# Patient Record
Sex: FEMALE | Race: WHITE | Employment: FULL TIME | URBAN - METROPOLITAN AREA
[De-identification: names, ages, dates, MRNs, and addresses within clinical notes are randomized per-mention and may not be internally consistent; named-entity substitution may affect disease eponyms.]

---

## 2017-09-19 ENCOUNTER — OFFICE VISIT (OUTPATIENT)
Dept: ORTHOPEDIC SURGERY | Age: 42
End: 2017-09-19

## 2017-09-19 VITALS
SYSTOLIC BLOOD PRESSURE: 104 MMHG | DIASTOLIC BLOOD PRESSURE: 73 MMHG | HEART RATE: 70 BPM | BODY MASS INDEX: 28.17 KG/M2 | WEIGHT: 165 LBS | HEIGHT: 64 IN

## 2017-09-19 DIAGNOSIS — S93.401A MODERATE ANKLE SPRAIN, RIGHT, INITIAL ENCOUNTER: ICD-10-CM

## 2017-09-19 DIAGNOSIS — M25.571 ACUTE RIGHT ANKLE PAIN: Primary | ICD-10-CM

## 2017-09-19 PROBLEM — S93.409A MODERATE ANKLE SPRAIN: Status: ACTIVE | Noted: 2017-09-19

## 2017-09-19 PROCEDURE — 99203 OFFICE O/P NEW LOW 30 MIN: CPT | Performed by: PODIATRIST

## 2017-09-19 PROCEDURE — L1902 AFO ANKLE GAUNTLET PRE OTS: HCPCS | Performed by: PODIATRIST

## 2017-09-19 PROCEDURE — 73610 X-RAY EXAM OF ANKLE: CPT | Performed by: PODIATRIST

## 2017-09-19 RX ORDER — VENLAFAXINE HYDROCHLORIDE 75 MG/1
75 CAPSULE, EXTENDED RELEASE ORAL DAILY
COMMUNITY

## 2017-09-19 RX ORDER — TRAZODONE HYDROCHLORIDE 150 MG/1
150 TABLET ORAL NIGHTLY
COMMUNITY
End: 2017-10-10 | Stop reason: HOSPADM

## 2017-09-19 RX ORDER — SPIRONOLACTONE 25 MG/1
25 TABLET ORAL DAILY
COMMUNITY

## 2017-09-20 ENCOUNTER — HOSPITAL ENCOUNTER (OUTPATIENT)
Dept: PHYSICAL THERAPY | Age: 42
Discharge: HOME | End: 2017-09-30
Admitting: PODIATRIST

## 2017-09-20 NOTE — PLAN OF CARE
[facility], [facility]  [address] [place] [address]  Phone: [phone]  Fax [phone]     Physical Therapy Certification    Dear Referring Practitioner: Luke Mccloud,    We had the pleasure of evaluating the following patient for physical therapy services at [address] [place] [address]. A summary of our findings can be found in the initial assessment below. This includes our plan of care. If you have any questions or concerns regarding these findings, please do not hesitate to contact me at the office phone number checked above. Thank you for the referral.       Physician Signature:_______________________________Date:__________________  By signing above (or electronic signature), therapists plan is approved by physician    Patient: Topher Diana   : 1975   MRN: [id number]  Referring Physician: Referring Practitioner: Luke Mccloud      Evaluation Date: 2017      Medical Diagnosis Information:  Diagnosis: R ankle sprain (M25.571)   Treatment Diagnosis: R ankle pain (M25.571)                                         Insurance information: PT Insurance Information: [place]     Precautions/ Contra-indications: None  Latex Allergy:  [x]NO      []YES  Preferred Language for Healthcare:   [x]English       []other:    SUBJECTIVE: Patient stated complaint: 3 weeks ago pt missed the last step with descending stairs, rolled foot underneath. Initially had high levels of pain, treated with compression. Was swollen and painful, pain was continuing. Pt started using Aircast yesterday, did use a figure 8 brace prior to this. Was taking steroids for back pain prior to this injury. Pt stated MD instructed her to wear the brace for 3 weeks until follow up.     Relevant Medical History: Sprained R ankle 20 years ago  Functional Disability Index:PT G-Codes  Functional Assessment Tool Used: LEFS  Score: 25%  Functional Limitation: Mobility: Walking and moving around  Mobility: Walking and Moving Around Current Status (): At least 20 percent but less than 40 percent impaired, limited or restricted  Mobility: Walking and Moving Around Goal Status (): At least 1 percent but less than 20 percent impaired, limited or restricted    Pain Scale: 0-3/10  Easing factors: elevation, ice, ibuprofen, rest  Provocative factors: Walking, standing, squatting, stairs    Type: []Constant   [x]Intermittent  []Radiating []Localized []other:     Numbness/Tingling: Denies    Occupation/School: Nurse practitioner in [employer]    Living Status/Prior Level of Function: Independent with ADLs and IADLs    OBJECTIVE:     ROM LEFT RIGHT   Ankle PF 65 45 w/ pain   Ankle DF 10 5    Ankle In 50 25 w/ pain   Ankle Ev 25 10 w/ pain   Strength  LEFT RIGHT   Ankle DF 5 4   Ankle PF 4+ 4   Ankle Inv 5 4- w/ pain   Ankle EV 5 4        Circumference  Mid apex  7 cm prox   49 cm.     49.5 cm     Reflexes/Sensation:    []Dermatomes/Myotomes intact    [x]Reflexes equal and normal bilaterally   []Other:    Joint mobility:   []Normal    []Hypo   []Hyper    Palpation: TTP along CFL, posterior and lateral ankle    Functional Mobility/Transfers: No limitations    Gait: (include devices/WB status) Mild antalgic gait, dec toe off on RLE      [x] Patient history, allergies, meds reviewed. Medical chart reviewed. See intake form. Review Of Systems (ROS):  [x]Performed Review of systems (Integumentary, CardioPulmonary, Neurological) by intake and observation. Intake form has been scanned into medical record. Patient has been instructed to contact their primary care physician regarding ROS issues if not already being addressed at this time.       Co-morbidities/Complexities (which will affect course of rehabilitation):  []None           Arthritic conditions   []Rheumatoid arthritis (M05.9)  [x]Osteoarthritis (M19.91)   Cardiovascular conditions   []Hypertension (I10)  []Hyperlipidemia prevent re-injury. 2. Patient will have a decrease in pain to facilitate improvement in movement, function, and ADLs as indicated by Functional Deficits. Long Term Goals: To be achieved in: 6 weeks  1. Disability index score of 12% or less for the LEFS to assist with reaching prior level of function. 2. Patient will demonstrate increased AROM to 50 deg PF, 10 deg DF, 40 deg inv, 20 deg ev without pain to allow for proper joint functioning as indicated by patients Functional Deficits. 3. Patient will demonstrate an increase in Strength to 5/5 without pain into dorsiflexion, plantarflexion, inversion and eversion to allow for proper functional mobility as indicated by patients Functional Deficits. 4. Patient will return to walking at work without increased symptoms or restriction.    5. Patient will return to pain and restriction free stair negotiation (patient specific functional goal)       Electronically signed by:  Alvin Wilson PT,DPT [id number]

## 2017-09-20 NOTE — FLOWSHEET NOTE
endurance, ROM for improvements in LE, proximal hip, and core control with self care, mobility, lifting, ambulation.  [] (08277) Provided verbal/tactile cueing for activities related to improving balance, coordination, kinesthetic sense, posture, motor skill, proprioception  to assist with LE, proximal hip, and core control in self care, mobility, lifting, ambulation and eccentric single leg control. NMR and Therapeutic Activities:    [x] (21565 or 09522) Provided verbal/tactile cueing for activities related to improving balance, coordination, kinesthetic sense, posture, motor skill, proprioception and motor activation to allow for proper function of core, proximal hip and LE with self care and ADLs  [] (50134) Gait Re-education- Provided training and instruction to the patient for proper LE, core and proximal hip recruitment and positioning and eccentric body weight control with ambulation re-education including up and down stairs     Home Exercise Program:    [x] (46004) Reviewed/Progressed HEP activities related to strengthening, flexibility, endurance, ROM of core, proximal hip and LE for functional self-care, mobility, lifting and ambulation/stair navigation   [] (99405)Reviewed/Progressed HEP activities related to improving balance, coordination, kinesthetic sense, posture, motor skill, proprioception of core, proximal hip and LE for self care, mobility, lifting, and ambulation/stair navigation      Manual Treatments:  PROM / STM / Oscillations-Mobs:  G-I, II, III, IV (PA's, Inf., Post.)  [] (59291) Provided manual therapy to mobilize LE, proximal hip and/or LS spine soft tissue/joints for the purpose of modulating pain, promoting relaxation,  increasing ROM, reducing/eliminating soft tissue swelling/inflammation/restriction, improving soft tissue extensibility and allowing for proper ROM for normal function with self care, mobility, lifting and ambulation.      Modalities:  HV/CP to R ankle in long sitting eval    Treatment/Activity Tolerance:  [x] Patient tolerated treatment well [] Patient limited by fatique  [] Patient limited by pain  [] Patient limited by other medical complications  [] Other:     Prognosis: [x] Good [] Fair  [] Poor    Patient Requires Follow-up: [x] Yes  [] No    PLAN: See eval  [] Continue per plan of care [] Alter current plan (see comments)  [x] Plan of care initiated [] Hold pending MD visit [] Discharge    Electronically signed by: Asa Granados PT,DPT [id number]

## 2017-09-26 ENCOUNTER — HOSPITAL ENCOUNTER (OUTPATIENT)
Dept: PHYSICAL THERAPY | Age: 42
Discharge: HOME | End: 2017-09-26
Admitting: PODIATRIST

## 2017-09-28 ENCOUNTER — HOSPITAL ENCOUNTER (OUTPATIENT)
Dept: PHYSICAL THERAPY | Age: 42
Discharge: HOME | End: 2017-09-28
Admitting: PODIATRIST

## 2017-10-03 ENCOUNTER — HOSPITAL ENCOUNTER (OUTPATIENT)
Dept: PHYSICAL THERAPY | Age: 42
Discharge: HOME | End: 2017-10-03
Admitting: PODIATRIST

## 2017-10-03 NOTE — FLOWSHEET NOTE
Provided verbal/tactile cueing for activities related to improving balance, coordination, kinesthetic sense, posture, motor skill, proprioception  to assist with LE, proximal hip, and core control in self care, mobility, lifting, ambulation and eccentric single leg control. NMR and Therapeutic Activities:    [x] (82401 or 45389) Provided verbal/tactile cueing for activities related to improving balance, coordination, kinesthetic sense, posture, motor skill, proprioception and motor activation to allow for proper function of core, proximal hip and LE with self care and ADLs  [] (86438) Gait Re-education- Provided training and instruction to the patient for proper LE, core and proximal hip recruitment and positioning and eccentric body weight control with ambulation re-education including up and down stairs     Home Exercise Program:    [x] (65769) Reviewed/Progressed HEP activities related to strengthening, flexibility, endurance, ROM of core, proximal hip and LE for functional self-care, mobility, lifting and ambulation/stair navigation   [] (01359)Reviewed/Progressed HEP activities related to improving balance, coordination, kinesthetic sense, posture, motor skill, proprioception of core, proximal hip and LE for self care, mobility, lifting, and ambulation/stair navigation      Manual Treatments:  PROM / STM / Oscillations-Mobs:  G-I, II, III, IV (PA's, Inf., Post.)  [x] (09113) Provided manual therapy to mobilize LE, proximal hip and/or LS spine soft tissue/joints for the purpose of modulating pain, promoting relaxation,  increasing ROM, reducing/eliminating soft tissue swelling/inflammation/restriction, improving soft tissue extensibility and allowing for proper ROM for normal function with self care, mobility, lifting and ambulation.      Modalities:  HV/CP to R ankle in long sitting 15 min    Charges:  Timed Code Treatment Minutes: 39'   Total Treatment Minutes: 61'     [] EVAL (LOW) 455 6741 (typically 20 minutes face-to-face)  [] EVAL (MOD) 79152 (typically 30 minutes face-to-face)  [] EVAL (HIGH) 50073 (typically 45 minutes face-to-face)  [] RE-EVAL     [x] CW(63745) x  1   [] IONTO  [x] NMR (21318) x  1   [] VASO  [x] Manual (56914) x  1    [] Other:  [] TA x       [] Mech Traction (02135)  [] ES(attended) (90961)      [x] ES (un) (19990):     GOALS:   Patient stated goal: Return to normal activities without pain    Therapist goals for Patient:   Short Term Goals: To be achieved in: 2 weeks  1. Independent in HEP and progression per patient tolerance, in order to prevent re-injury. 2. Patient will have a decrease in pain to facilitate improvement in movement, function, and ADLs as indicated by Functional Deficits. Long Term Goals: To be achieved in: 6 weeks  1. Disability index score of 12% or less for the LEFS to assist with reaching prior level of function. 2. Patient will demonstrate increased AROM to 50 deg PF, 10 deg DF, 40 deg inv, 20 deg ev without pain to allow for proper joint functioning as indicated by patients Functional Deficits. 3. Patient will demonstrate an increase in Strength to 5/5 without pain into dorsiflexion, plantarflexion, inversion and eversion to allow for proper functional mobility as indicated by patients Functional Deficits. 4. Patient will return to walking at work without increased symptoms or restriction. 5. Patient will return to pain and restriction free stair negotiation (patient specific functional goal)      Progression Towards Functional goals:  [x] Patient is progressing as expected towards functional goals listed. [] Progression is slowed due to complexities listed. [] Progression has been slowed due to co-morbidities. [] Plan just implemented, too soon to assess goals progression  [] Other:     ASSESSMENT:  Pt with some soreness upon arrival to PT today, she did well with [employer]er work but did have pain with all plantarflexion activities.   Limited progression to weight bearing activities, will resume progression at [place]     Treatment/Activity Tolerance:  [x] Patient tolerated treatment well [] Patient limited by fatique  [x] Patient limited by pain  [] Patient limited by other medical complications  [] Other:     Prognosis: [x] Good [] Fair  [] Poor    Patient Requires Follow-up: [x] Yes  [] No    PLAN: Work on SL balance at next visit and step downs.   [x] Continue per plan of care [] Alter current plan (see comments)  [] Plan of care initiated [] Hold pending MD visit [] Discharge    Electronically signed by: Jeremy Kevin, PT,DPT [id number]

## 2017-10-10 ENCOUNTER — OFFICE VISIT (OUTPATIENT)
Dept: ORTHOPEDIC SURGERY | Age: 42
End: 2017-10-10

## 2017-10-10 ENCOUNTER — HOSPITAL ENCOUNTER (OUTPATIENT)
Dept: PHYSICAL THERAPY | Age: 42
Discharge: HOME | End: 2017-10-10
Admitting: PODIATRIST

## 2017-10-10 VITALS
HEART RATE: 64 BPM | DIASTOLIC BLOOD PRESSURE: 76 MMHG | BODY MASS INDEX: 28.15 KG/M2 | WEIGHT: 164.9 LBS | SYSTOLIC BLOOD PRESSURE: 118 MMHG | HEIGHT: 64 IN

## 2017-10-10 DIAGNOSIS — S93.401D MODERATE ANKLE SPRAIN, RIGHT, SUBSEQUENT ENCOUNTER: Primary | ICD-10-CM

## 2017-10-10 PROCEDURE — 99213 OFFICE O/P EST LOW 20 MIN: CPT | Performed by: PODIATRIST

## 2017-10-10 RX ORDER — MAGNESIUM OXIDE 400 MG/1
400 TABLET ORAL DAILY
COMMUNITY

## 2017-10-10 NOTE — FLOWSHEET NOTE
[facility], [address]  Phone: [phone]  Fax [phone]    Physical Therapy Daily Treatment Note  Date:  10/10/2017    Patient Name:  Mathew Garrett    :  1975  MRN: [id number]  Restrictions/Precautions:    Medical/Treatment Diagnosis Information:  Diagnosis: R ankle sprain (M25.571)  Treatment Diagnosis: R ankle pain (M20.605)  Insurance/Certification information:  PT Insurance Information: [place]  Physician Information:  Referring Practitioner: Giuseppe Sonia [facility] signed (Y/N):     Date of Patient follow up with Physician: 10/10/17    G-Code (if applicable):      Date G-Code Applied:         Progress Note: [x]  Yes  []  No  Next due by: Visit #10       Latex Allergy:  [x]NO      []YES  Preferred Language for Healthcare:   [x]English       []other:    Visit # Insurance Allowable   5 30     Pain level:  0/10     SUBJECTIVE:  Pt states she is feeling good, pain mostly occurring with prolonged standing/walking at work. Pain is controlled at this point. OBJECTIVE:   Observation: Inc swelling laterally (pt came straight from work where she stands majority of day)  Test measurements:  70 PF, 5 DF, 40 deg inv, 20 deg ev; 5/5 strength all directions    RESTRICTIONS/PRECAUTIONS: None    Exercises/Interventions:     Therapeutic Ex Sets/sec Reps Notes         Incline stretch 30\" 3    Standing heelraises 2 10    Standing heelraises w/ eccentric lower RLE  15x    Soleus pumps 20# 2 10                                        Manual Intervention      STM to peroneal mm.   3'    Calcaneal mobs, forefoot mobs all directions  10'                            NMR re-education      Step up and over 6\" step   20x    SLB on airex 10\" 10                                Therapeutic Exercise and NMR EXR  [x] (98248) Provided verbal/tactile cueing for activities related to strengthening, flexibility, endurance, ROM for improvements Treatment Minutes: 39'   Total Treatment Minutes: 54'     [] EVAL (LOW) 17269 (typically 20 minutes face-to-face)  [] EVAL (MOD) 21406 (typically 30 minutes face-to-face)  [] EVAL (HIGH) 72650 (typically 45 minutes face-to-face)  [] RE-EVAL     [x] YB(09522) x  1   [] IONTO  [x] NMR (19891) x  1   [] VASO  [x] Manual (76706) x  1    [] Other:  [] TA x       [] Mech Traction (90474)  [] ES(attended) (36505)      [x] ES (un) (92551):     GOALS:   Patient stated goal: Return to normal activities without pain    Therapist goals for Patient:   Short Term Goals: To be achieved in: 2 weeks  1. Independent in HEP and progression per patient tolerance, in order to prevent re-injury. 2. Patient will have a decrease in pain to facilitate improvement in movement, function, and ADLs as indicated by Functional Deficits. Long Term Goals: To be achieved in: 6 weeks  1. Disability index score of 12% or less for the LEFS to assist with reaching prior level of function. 2. Patient will demonstrate increased AROM to 50 deg PF, 10 deg DF, 40 deg inv, 20 deg ev without pain to allow for proper joint functioning as indicated by patients Functional Deficits. 3. Patient will demonstrate an increase in Strength to 5/5 without pain into dorsiflexion, plantarflexion, inversion and eversion to allow for proper functional mobility as indicated by patients Functional Deficits. 4. Patient will return to walking at work without increased symptoms or restriction. 5. Patient will return to pain and restriction free stair negotiation (patient specific functional goal)      Progression Towards Functional goals:  [x] Patient is progressing as expected towards functional goals listed. [] Progression is slowed due to complexities listed. [] Progression has been slowed due to co-morbidities.   [] Plan just implemented, too soon to assess goals progression  [] Other:     ASSESSMENT:  Pt with improved tolerance for standing activities today, discussed that she follow up in 2 weeks to update HEP/progress strengthening and d/c     Treatment/Activity Tolerance:  [x] Patient tolerated treatment well [] Patient limited by fatique  [] Patient limited by pain  [] Patient limited by other medical complications  [] Other:     Prognosis: [x] Good [] Fair  [] Poor    Patient Requires Follow-up: [x] Yes  [] No    PLAN: DC next visit  [x] Continue per plan of care [] Alter current plan (see comments)  [] Plan of care initiated [] Hold pending MD visit [] Discharge    Electronically signed by: Abel Osborn, PT,DPT [id number]

## 2017-11-01 ENCOUNTER — HOSPITAL ENCOUNTER (OUTPATIENT)
Dept: PHYSICAL THERAPY | Age: 42
Discharge: HOME | End: 2017-11-30
Attending: PODIATRIST | Admitting: PODIATRIST

## 2018-07-29 ENCOUNTER — APPOINTMENT (OUTPATIENT)
Dept: GENERAL RADIOLOGY | Age: 43
End: 2018-07-29
Payer: COMMERCIAL

## 2018-07-29 ENCOUNTER — HOSPITAL ENCOUNTER (EMERGENCY)
Age: 43
Discharge: HOME | End: 2018-07-29
Attending: EMERGENCY MEDICINE
Payer: COMMERCIAL

## 2018-07-29 VITALS
OXYGEN SATURATION: 96 % | WEIGHT: 160 LBS | SYSTOLIC BLOOD PRESSURE: 125 MMHG | TEMPERATURE: 98.1 F | HEIGHT: 64 IN | RESPIRATION RATE: 18 BRPM | BODY MASS INDEX: 27.31 KG/M2 | DIASTOLIC BLOOD PRESSURE: 84 MMHG | HEART RATE: 108 BPM

## 2018-07-29 DIAGNOSIS — S93.401A MODERATE RIGHT ANKLE SPRAIN, INITIAL ENCOUNTER: Primary | ICD-10-CM

## 2018-07-29 PROCEDURE — 96372 THER/PROPH/DIAG INJ SC/IM: CPT

## 2018-07-29 PROCEDURE — 4500000023 HC ED LEVEL 3 PROCEDURE

## 2018-07-29 PROCEDURE — 90471 IMMUNIZATION ADMIN: CPT | Performed by: EMERGENCY MEDICINE

## 2018-07-29 PROCEDURE — 6370000000 HC RX 637 (ALT 250 FOR IP): Performed by: EMERGENCY MEDICINE

## 2018-07-29 PROCEDURE — 99283 EMERGENCY DEPT VISIT LOW MDM: CPT

## 2018-07-29 PROCEDURE — 73620 X-RAY EXAM OF FOOT: CPT

## 2018-07-29 PROCEDURE — 90715 TDAP VACCINE 7 YRS/> IM: CPT | Performed by: EMERGENCY MEDICINE

## 2018-07-29 PROCEDURE — 6360000002 HC RX W HCPCS: Performed by: EMERGENCY MEDICINE

## 2018-07-29 PROCEDURE — 73630 X-RAY EXAM OF FOOT: CPT

## 2018-07-29 RX ORDER — CEFAZOLIN SODIUM 1 G/3ML
1 INJECTION, POWDER, FOR SOLUTION INTRAMUSCULAR; INTRAVENOUS ONCE
Status: DISCONTINUED | OUTPATIENT
Start: 2018-07-29 | End: 2018-07-29 | Stop reason: SDUPTHER

## 2018-07-29 RX ORDER — CEPHALEXIN 500 MG/1
500 CAPSULE ORAL 4 TIMES DAILY
Qty: 40 CAPSULE | Refills: 0 | Status: SHIPPED | OUTPATIENT
Start: 2018-07-29

## 2018-07-29 RX ORDER — HYDROCODONE BITARTRATE AND ACETAMINOPHEN 5; 325 MG/1; MG/1
1 TABLET ORAL EVERY 6 HOURS PRN
Qty: 15 TABLET | Refills: 0 | Status: SHIPPED | OUTPATIENT
Start: 2018-07-29 | End: 2018-08-01

## 2018-07-29 RX ORDER — CEFAZOLIN SODIUM 1 G/3ML
1 INJECTION, POWDER, FOR SOLUTION INTRAMUSCULAR; INTRAVENOUS ONCE
Status: COMPLETED | OUTPATIENT
Start: 2018-07-29 | End: 2018-07-29

## 2018-07-29 RX ORDER — HYDROCODONE BITARTRATE AND ACETAMINOPHEN 5; 325 MG/1; MG/1
1 TABLET ORAL ONCE
Status: COMPLETED | OUTPATIENT
Start: 2018-07-29 | End: 2018-07-29

## 2018-07-29 RX ORDER — IBUPROFEN 600 MG/1
600 TABLET ORAL EVERY 6 HOURS PRN
Qty: 40 TABLET | Refills: 0 | Status: SHIPPED | OUTPATIENT
Start: 2018-07-29

## 2018-07-29 RX ADMIN — HYDROCODONE BITARTRATE AND ACETAMINOPHEN 1 TABLET: 5; 325 TABLET ORAL at 20:31

## 2018-07-29 RX ADMIN — CEFAZOLIN SODIUM 1 G: 1 INJECTION, POWDER, FOR SOLUTION INTRAMUSCULAR; INTRAVENOUS at 21:02

## 2018-07-29 RX ADMIN — TETANUS TOXOID, REDUCED DIPHTHERIA TOXOID AND ACELLULAR PERTUSSIS VACCINE, ADSORBED 0.5 ML: 5; 2.5; 8; 8; 2.5 SUSPENSION INTRAMUSCULAR at 18:59

## 2018-07-29 ASSESSMENT — PAIN DESCRIPTION - PAIN TYPE: TYPE: ACUTE PAIN

## 2018-07-29 ASSESSMENT — PAIN DESCRIPTION - ORIENTATION: ORIENTATION: LEFT

## 2018-07-29 ASSESSMENT — PAIN DESCRIPTION - LOCATION: LOCATION: TOE (COMMENT WHICH ONE)

## 2018-07-29 ASSESSMENT — PAIN SCALES - GENERAL: PAINLEVEL_OUTOF10: 5

## 2018-07-30 ENCOUNTER — OFFICE VISIT (OUTPATIENT)
Dept: ORTHOPEDIC SURGERY | Age: 43
End: 2018-07-30

## 2018-07-30 VITALS
HEART RATE: 107 BPM | HEIGHT: 64 IN | SYSTOLIC BLOOD PRESSURE: 110 MMHG | BODY MASS INDEX: 27.33 KG/M2 | DIASTOLIC BLOOD PRESSURE: 84 MMHG | WEIGHT: 160.05 LBS

## 2018-07-30 DIAGNOSIS — S92.412A CLOSED DISPLACED FRACTURE OF PROXIMAL PHALANX OF LEFT GREAT TOE, INITIAL ENCOUNTER: Primary | ICD-10-CM

## 2018-07-30 PROCEDURE — 99214 OFFICE O/P EST MOD 30 MIN: CPT | Performed by: ORTHOPAEDIC SURGERY

## 2018-07-30 NOTE — ED NOTES
Applied bacitracin, 2x2 gauze, ciera wrap, and a stockinette. Patient tolerated well left foot put in post-op shoe and given crutches. Ambulated well using crutches.       Lemuel Delaney  07/29/18 2053

## 2018-07-30 NOTE — ED NOTES
Findings and plan of care discussed at bedside by provider. Pt verbalized understanding of plan of care, pt discharged with all instructions reviewed and any prescriptions as applicable. All belongings in hand including discharge instructions, prescriptions, and personal belongings. Pt in no acute distress.      Valarie Willson RN  07/29/18 4888

## 2018-07-30 NOTE — PROGRESS NOTES
daily, Disp: 40 capsule, Rfl: 0    magnesium oxide (MAG-OX) 400 MG tablet, Take 400 mg by mouth daily, Disp: , Rfl:     venlafaxine (EFFEXOR XR) 75 MG extended release capsule, Take 75 mg by mouth daily, Disp: , Rfl:     spironolactone (ALDACTONE) 25 MG tablet, Take 25 mg by mouth daily, Disp: , Rfl:   Allergies:  Amoxicillin; Nitrofurantoin; and Vancomycin  Social History:    reports that she has never smoked. She has never used smokeless tobacco. She reports that she drinks alcohol. She reports that she does not use drugs. Family History:   History reviewed. No pertinent family history. REVIEW OF SYSTEMS:   For new problems, a full review of systems will be found scanned in the patient's chart. CONSTITUTIONAL: Denies unexplained weight loss, fevers, chills   NEUROLOGICAL: Denies unsteady gait or progressive weakness  SKIN: Denies skin changes, delayed healing, rash, itching       PHYSICAL EXAM:    Vitals: Blood pressure 110/84, pulse 107, height 5' 4.02\" (1.626 m), weight 160 lb 0.9 oz (72.6 kg), not currently breastfeeding. GENERAL EXAM:  · General Apparence: Patient is adequately groomed with no evidence of malnutrition. · Orientation: The patient is oriented to time, place and person. · Mood & Affect:The patient's mood and affect are appropriate       Left great toe PHYSICAL EXAMINATION:  · Inspection:  2 horizontal lacerations are visible. The dorsal aspect of the great toe. Sutures are in place. No erythema, drainage or other signs of infection. The nail bed is not involved. The transverse flap appears viable. It is slightly dusky. · Palpation:  Tenderness to palpation of the fractured area as expected      · Range of Motion: Range motion of the toes limited by pain as expected    · Strength: Not tested secondary to pain    · Special Tests:  Neurovascular exam is intact to the distal extremity            · Skin:  There are no rashes, ulcerations or lesions.     · Gait & station: Antalgic All efforts were made to ensure that this office note is accurate.           Karrie Weiss MD

## 2018-08-06 ENCOUNTER — OFFICE VISIT (OUTPATIENT)
Dept: ORTHOPEDIC SURGERY | Age: 43
End: 2018-08-06

## 2018-08-06 VITALS — HEIGHT: 64 IN | WEIGHT: 160 LBS | BODY MASS INDEX: 27.31 KG/M2

## 2018-08-06 DIAGNOSIS — S92.412A CLOSED DISPLACED FRACTURE OF PROXIMAL PHALANX OF LEFT GREAT TOE, INITIAL ENCOUNTER: ICD-10-CM

## 2018-08-06 DIAGNOSIS — R52 PAIN: Primary | ICD-10-CM

## 2018-08-06 PROCEDURE — 99213 OFFICE O/P EST LOW 20 MIN: CPT | Performed by: ORTHOPAEDIC SURGERY

## 2018-08-06 NOTE — PROGRESS NOTES
CHIEF COMPLAINT:    Chief Complaint   Patient presents with    Toe Pain     CK LEFT GREAT TOE FX- IS GETTING BETTER       HISTORY OF PRESENT ILLNESS:                The patient is a 37 y.o. female presents today after her fairly significant great toe fracture distal proximal phalanx with intra-articular component due to the injury on 7/29/2018. This was very comminuted. She has been in a walking boot using a [employer] scooter. She works for [employer]. She's doing very well overall. She does have pain as expected. She is here today for repeat x-ray to determine course of treatment. History reviewed. No pertinent past medical history. The pain assessment was noted & is as follows:  Pain Assessment  Location of Pain: Toe  Location Modifiers: Left  Severity of Pain: 3  Quality of Pain: Aching  Duration of Pain: Persistent]      Work Status/Functionality:     Past Medical History: Medical history form was reviewed today & can be found in the media tab  History reviewed. No pertinent past medical history. Past Surgical History:     Past Surgical History:   Procedure Laterality Date    BLADDER REPAIR      HYSTERECTOMY       Current Medications:     Current Outpatient Prescriptions:     ibuprofen (IBU) 600 MG tablet, Take 1 tablet by mouth every 6 hours as needed for Pain, Disp: 40 tablet, Rfl: 0    cephALEXin (KEFLEX) 500 MG capsule, Take 1 capsule by mouth 4 times daily, Disp: 40 capsule, Rfl: 0    magnesium oxide (MAG-OX) 400 MG tablet, Take 400 mg by mouth daily, Disp: , Rfl:     venlafaxine (EFFEXOR XR) 75 MG extended release capsule, Take 75 mg by mouth daily, Disp: , Rfl:     spironolactone (ALDACTONE) 25 MG tablet, Take 25 mg by mouth daily, Disp: , Rfl:   Allergies:  Amoxicillin; Nitrofurantoin; and Vancomycin  Social History:    reports that she has never smoked. She has never used smokeless tobacco. She reports that she drinks alcohol. She reports that she does not use drugs.   Family Placed This Encounter   Procedures    XR TOE LEFT (MIN 2 VIEWS)         Assessment / Treatment Plan:     1. Comminuted proximal phalanx fractureLEFT great toe    I'm not sure that I can improve the patient's fracture configuration but will consult with Dr. Israel Gallego determine if there is any potential improvement. She will otherwise continue with the plan of protected weightbearing. She is going to have her sutures removed at the end of the week in the emergency room. We will plan to see her back in 2 weeks and lasts I call her with a different plan. I have personally performed and/or participated in the history, exam and medical decision making and agree with all pertinent clinical information. I have also reviewed and agree with the past medical, family and social history unless otherwise noted. This dictation was performed with a verbal recognition program (DRAGON) and it was checked for errors. It is possible that there are still dictated errors within this office note. If so, please bring any errors to my attention for an addendum. All efforts were made to ensure that this office note is accurate.           Karrie Weiss MD

## 2018-08-07 ENCOUNTER — TELEPHONE (OUTPATIENT)
Dept: ORTHOPEDIC SURGERY | Age: 43
End: 2018-08-07

## 2018-08-07 NOTE — TELEPHONE ENCOUNTER
I reviewed this patient's x-rays with Dr. Pastor Pleitez today in consultation him for his advice on treatment of this fracture. Dr. Pastor Pleitez recommends reduction and pinning of this fracture. I spoke with the patient on the phone and she is going to see Dr. Pastor Pleitez at the [facility] office tomorrow at 3:45 to discuss surgical treatment options. She'll return to our clinic with Dr. Dimas Yang as needed. The patient understands and agrees with this plan. Ulises Irwin, [facility]    This dictation was performed with a verbal recognition program [facility] ) and it was checked for errors. It is possible that there are still dictated errors within this office note. If so, please bring any errors to my attention for an addendum. All efforts were made to ensure that this office note is accurate.

## 2018-08-08 ENCOUNTER — OFFICE VISIT (OUTPATIENT)
Dept: ORTHOPEDIC SURGERY | Age: 43
End: 2018-08-08

## 2018-08-08 VITALS — WEIGHT: 160 LBS | HEIGHT: 64 IN | BODY MASS INDEX: 27.31 KG/M2

## 2018-08-08 DIAGNOSIS — M79.675 GREAT TOE PAIN, LEFT: Primary | ICD-10-CM

## 2018-08-08 DIAGNOSIS — S92.402A CLOSED FRACTURE OF PHALANX OF BOTH GREAT TOES, INITIAL ENCOUNTER: ICD-10-CM

## 2018-08-08 DIAGNOSIS — S92.401A CLOSED FRACTURE OF PHALANX OF BOTH GREAT TOES, INITIAL ENCOUNTER: ICD-10-CM

## 2018-08-08 PROCEDURE — 99243 OFF/OP CNSLTJ NEW/EST LOW 30: CPT | Performed by: ORTHOPAEDIC SURGERY

## 2018-08-08 RX ORDER — CEPHALEXIN 500 MG/1
500 CAPSULE ORAL 4 TIMES DAILY
Qty: 8 CAPSULE | Refills: 0 | Status: SHIPPED | OUTPATIENT
Start: 2018-08-08 | End: 2018-08-10

## 2018-08-08 RX ORDER — HYDROCODONE BITARTRATE AND ACETAMINOPHEN 5; 325 MG/1; MG/1
1 TABLET ORAL EVERY 8 HOURS PRN
Qty: 21 TABLET | Refills: 0 | Status: SHIPPED | OUTPATIENT
Start: 2018-08-08 | End: 2018-08-15

## 2018-08-08 NOTE — LETTER
[facility]  Surgery Precert & Billing Form:    DEMOGRAPHICS:                                                                                                       Patient Name:  Charu Morataya  Patient :  1975   Patient SS#:      Patient Phone:  [phone] (home)  Alt.  Patient Phone:    Patient Address:   [address] [address]    PCP:  [address]: BRUNA    DIAGNOSIS & PROCEDURE:                                                                                      Diagnosis: LEFT GREAT TOE FRACTURE - S92.412A  Operation: left    SURGERY  INFORMATION  Date of Surgery:   18  Location:    [facility]  Type:    Outpatient  23 hour hold:  No  Surgeon:          Agustin To MD  18     BILLING INFORMATION:                                                                                                Physician Procedure                                            CPT Codes                      PA, or Fellow Procedure                                      CPT Codes                      Precert information:HELGAEM NPR PER TJ  Per DW

## 2018-08-08 NOTE — LETTER
Surgery Scheduling Form for [facility]   FAX# [phone]    Pa Valero MD    Surgical Procedure: CLOSED REDUCTION /  & PERCUTANEOUS PINNING / 90805 VERSUS ORIF LEFT GREAT TOE FRACTURE / 49814    Scheduled Date: 18     Scheduled Time: 1:15 PM    Length of Surgery: 60 minutes    Patient Information:   Name: Salome Lundy    YOB: 1975 Gender: female    SSN:     Address: [address] [place] [address]     Phone number: [phone] (home)     Primary Care Physician: Jose SOOD    Classification:  [x]Same Day   [] Admit Same Day  [] Already Inpatient    Pre- Op Diagnosis: CLOSED DISPLACED LEFT GREAT TOE FRACTURE - S92.412A    Anesthesia Type:   [x]General []MAC    []IV Regional []Local []Other: BLOCK               SCD:  [x]Knee High []Thigh High    Allergies: Allergies   Allergen Reactions    Amoxicillin Rash    Nitrofurantoin Rash    Vancomycin Rash     Latex: []YES          [x]NO    []C-ARM needed [x]Special Equipment: ARTHREX SMALL JOINT PLATES , .939 K-WIRES   []Rep Notified      [x]   Mini C-ARM  []  None Needed           Insurance Information: BRUNA    [x]Card in Media in EPIC Chart    []Card Faxed    Special Requests:     Remarks/Comments:    Scheduled By: Ana Clarke 2018                      [facility]    IN ACCORDANCE WITH OUR [address] [place] [address] Street, A GENERIC EQUIVALENT DRUG MAY BE DISPENSED AND ADMINISTERED UNLESS D. A. W. IS WRITTEN WITH THE MEDICATION ORDER  PRE-SURGICAL PHYSICIAN ORDERS  ORTHOPEDIC SURGERY    Patient Name Salome Lundy                              1975     Surgical Procedure   CLOSED REDUCTION / 44021 & PERCUTANEOUS PINNING / 24371 VERSUS ORIF LEFT GREAT TOE FRACTURE / 50694      Date of Surgery 18     []IAdmit as Inpatient    [x]I Outpatient  [] Already Inpatient    Height 5 FT 4 IN   Weight 160 LBS    Allergies    Allergies   Allergen Reactions    Amoxicillin Rash    Nitrofurantoin Rash    Vancomycin Rash MRSA positive or history:     Pre-surgery Testing Orders:   [x]I Pre-surgical Anesthesia Orders Per Anesthesiologist  Additional Testing:    []IU/A               []I Urine C/S                             []I CBC w Diff                             []I Type & Screen                             []I PTINR           []I PTT           []I Transferrin         []I Albumin    []I BMP   []I Other  []I CXR (medical reason)        Preop Antibiotic Prophylaxis / DAY OF SURGERY 18     []I Cefazolin IVPB per weight base protocol  ? Cefazolin 2 grams if <119.9 kg  ? Cefazolin 3 grams if ?120kg (?264 lbs. )  ? Pediatric(<12yo) Dosinmg/kg (maximum 2 grams)     If allergic to PCN    [x]I  Clindamycin 900 mg IVPB   ___________________________________________  If allergic to PCN/Cephalosporin, positive MRSA/history or ? 72  age (risk of C-difficile):   []I  Vancomycin IVPB per weight base protocol  ? Vancomycin 1 gm if < 80kg (<176 lbs. )  ? Vancomycin 1.5 gm if ?80kg to <120kg (?176 to <264 lbs. )  ? Vancomycin 2 gm if  if ?120kg (?264 lbs.)   ADDITIONAL MEDICATIONS:      []I OFIRMEV IVPB 1gm over 15 minutes (Adjust to body weight when needed.    DVT PREVENTION:     [x]I  Knee high Antithrombic wraps (Age 16 & older)        []I  Bilateral             [x]I   Right                 []I Left   []I  Thigh NNEKA Hose          Signature                                  Lan Nathan MD           Date  18    4:56 PM  Please fax at time of booking the case to the Scheduling office at  [facility] at [phone]                                                  Updated 2015  2

## 2018-08-08 NOTE — PROGRESS NOTES
Chief Complaint    New Patient (L Great Toe)      History of Present Illness:  Janel Calvert is a 37 y.o. female who is here in consultation at the request of Dr. Oscar Theodore and Dr. Edel Abdi for evaluation chief complaint of left great toe pain. She states that on 7/29/18 she was trying to put a slab of Granite up onto a shelf and it slipped and came down directly on her left great toe. She had a comminuted intra-articular fracture of the proximal phalanx of the great toe and was initially seen by Dr. Caryn Rocha. She was subsequently referred here for definitive care. Currently rates her pain at 3-4 out of 10. She does have some hypersensitivity distal to the lacerations from the [place]. She's been in a walking boot and using a Roll-A-Bout. She works in the emergency room at [employer] History:  Patient's medications, allergies, past medical, surgical, social and family histories were reviewed and updated as appropriate. Review of Systems:  Pertinent items are noted in HPI  Review of systems reviewed from Patient History Form dated on 8/6/18 and available in the patient's chart under the Media tab. Vital Signs:  Ht 5' 4\" (1.626 m)   Wt 160 lb (72.6 kg)   BMI 27.46 kg/m²     General Exam:   Constitutional: Patient is adequately groomed with no evidence of malnutrition  DTRs: Deep tendon reflexes are intact  Mental Status: The patient is oriented to time, place and person. The patient's mood and affect are appropriate. Foot Examination:    Inspection:  Mild swelling through the left great toe 2 suture lines on the dorsal aspect of the left great toe.   Appear to be healing no evidence of infection    Palpation:  Mild tenderness over the dorsal aspect of the IP joint left great toe    Range of Motion:  Limited in the great toe MTP and IP joint due to pain    Strength:  Flexion extensor tendons appear to be working    Special Tests:  No hypermobility through the midfoot    Skin: There are no

## 2018-08-09 ENCOUNTER — TELEPHONE (OUTPATIENT)
Dept: ORTHOPEDIC SURGERY | Age: 43
End: 2018-08-09

## 2018-08-09 RX ORDER — AMITRIPTYLINE HYDROCHLORIDE 50 MG/1
50 TABLET, FILM COATED ORAL NIGHTLY
COMMUNITY

## 2018-08-09 NOTE — PROGRESS NOTES
Obstructive Sleep Apnea (RACHEL) Screening     Patient:  Johny Sommer    YOB: 1975      Medical Record #:  [id number]                     Date:  8/9/2018     1. Are you a loud and/or regular snorer? []  Yes       [x] No    2. Have you been observed to gasp or stop breathing during sleep? []  Yes       [x] No    3. Do you feel tired or groggy upon awakening or do you awaken with a headache?           []  Yes       [] No    4. Are you often tired or fatigued during the wake time hours? []  Yes       [] No    5. Do you fall asleep sitting, reading, watching TV or driving? []  Yes       [] No    6. Do you often have problems with memory or concentration? []  Yes       [] No    **If patient's score is ? 3 they are considered high risk for RACHEL. Notify the anesthesiologist of the high risk and document in focus note. Note:  If the patient's BMI is more than 35 kg m¯² , has neck circumference > 40 cm, and/or high blood pressure the risk is greater (© American Sleep Apnea Association, 2006).

## 2018-08-10 ENCOUNTER — ANESTHESIA EVENT (OUTPATIENT)
Dept: OPERATING ROOM | Age: 43
End: 2018-08-10
Payer: COMMERCIAL

## 2018-08-12 NOTE — BRIEF OP NOTE
Brief Postoperative Note    Salome Lundy  YOB: 1975  [id number]    Pre-operative Diagnosis: Left great toe proximal phalanx intercondylar fracture    Post-operative Diagnosis: Same    Procedure: attempted closed reduction and then open  reduction percutaneous pinning left great toe fracture    Anesthesia: General    Surgeons/Assistants: Rashard    Estimated Blood Loss: less than 50     Complications: None    Specimens: Was Not Obtained    Findings: Left great toe fracture    Electronically signed by Delgado Polo MD on 8/13/2018 at 5:35 PM

## 2018-08-13 ENCOUNTER — TELEPHONE (OUTPATIENT)
Dept: ORTHOPEDIC SURGERY | Age: 43
End: 2018-08-13

## 2018-08-13 ENCOUNTER — ANESTHESIA (OUTPATIENT)
Dept: OPERATING ROOM | Age: 43
End: 2018-08-13
Payer: COMMERCIAL

## 2018-08-13 ENCOUNTER — HOSPITAL ENCOUNTER (OUTPATIENT)
Age: 43
Setting detail: OUTPATIENT SURGERY
Discharge: HOME | End: 2018-08-13
Attending: ORTHOPAEDIC SURGERY | Admitting: ORTHOPAEDIC SURGERY
Payer: COMMERCIAL

## 2018-08-13 VITALS
OXYGEN SATURATION: 95 % | SYSTOLIC BLOOD PRESSURE: 158 MMHG | RESPIRATION RATE: 8 BRPM | DIASTOLIC BLOOD PRESSURE: 104 MMHG

## 2018-08-13 VITALS
BODY MASS INDEX: 28.85 KG/M2 | WEIGHT: 169 LBS | SYSTOLIC BLOOD PRESSURE: 117 MMHG | OXYGEN SATURATION: 95 % | HEART RATE: 117 BPM | HEIGHT: 64 IN | DIASTOLIC BLOOD PRESSURE: 81 MMHG | RESPIRATION RATE: 16 BRPM | TEMPERATURE: 97.5 F

## 2018-08-13 PROCEDURE — 2500000003 HC RX 250 WO HCPCS: Performed by: ORTHOPAEDIC SURGERY

## 2018-08-13 PROCEDURE — C1713 ANCHOR/SCREW BN/BN,TIS/BN: HCPCS | Performed by: ORTHOPAEDIC SURGERY

## 2018-08-13 PROCEDURE — 2709999900 HC NON-CHARGEABLE SUPPLY: Performed by: ORTHOPAEDIC SURGERY

## 2018-08-13 PROCEDURE — 6370000000 HC RX 637 (ALT 250 FOR IP): Performed by: ANESTHESIOLOGY

## 2018-08-13 PROCEDURE — 2580000003 HC RX 258: Performed by: ORTHOPAEDIC SURGERY

## 2018-08-13 PROCEDURE — 7100000011 HC PHASE II RECOVERY - ADDTL 15 MIN: Performed by: ORTHOPAEDIC SURGERY

## 2018-08-13 PROCEDURE — 2500000003 HC RX 250 WO HCPCS: Performed by: NURSE ANESTHETIST, CERTIFIED REGISTERED

## 2018-08-13 PROCEDURE — 3700000001 HC ADD 15 MINUTES (ANESTHESIA): Performed by: ORTHOPAEDIC SURGERY

## 2018-08-13 PROCEDURE — 2580000003 HC RX 258: Performed by: ANESTHESIOLOGY

## 2018-08-13 PROCEDURE — 3600000013 HC SURGERY LEVEL 3 ADDTL 15MIN: Performed by: ORTHOPAEDIC SURGERY

## 2018-08-13 PROCEDURE — 7100000001 HC PACU RECOVERY - ADDTL 15 MIN: Performed by: ORTHOPAEDIC SURGERY

## 2018-08-13 PROCEDURE — 6360000002 HC RX W HCPCS

## 2018-08-13 PROCEDURE — 6360000002 HC RX W HCPCS: Performed by: NURSE ANESTHETIST, CERTIFIED REGISTERED

## 2018-08-13 PROCEDURE — 7100000010 HC PHASE II RECOVERY - FIRST 15 MIN: Performed by: ORTHOPAEDIC SURGERY

## 2018-08-13 PROCEDURE — 6360000002 HC RX W HCPCS: Performed by: ANESTHESIOLOGY

## 2018-08-13 PROCEDURE — 3600000003 HC SURGERY LEVEL 3 BASE: Performed by: ORTHOPAEDIC SURGERY

## 2018-08-13 PROCEDURE — 3700000000 HC ANESTHESIA ATTENDED CARE: Performed by: ORTHOPAEDIC SURGERY

## 2018-08-13 PROCEDURE — 7100000000 HC PACU RECOVERY - FIRST 15 MIN: Performed by: ORTHOPAEDIC SURGERY

## 2018-08-13 DEVICE — K WIRE FIX L152MM DIA1.1MM S STL DBL TRCR TIP STYL 1: Type: IMPLANTABLE DEVICE | Status: FUNCTIONAL

## 2018-08-13 RX ORDER — MEPERIDINE HYDROCHLORIDE 50 MG/ML
12.5 INJECTION INTRAMUSCULAR; INTRAVENOUS; SUBCUTANEOUS EVERY 5 MIN PRN
Status: DISCONTINUED | OUTPATIENT
Start: 2018-08-13 | End: 2018-08-13 | Stop reason: HOSPADM

## 2018-08-13 RX ORDER — LEVOFLOXACIN 500 MG/1
500 TABLET, FILM COATED ORAL
COMMUNITY
Start: 2018-06-21

## 2018-08-13 RX ORDER — MIDAZOLAM HYDROCHLORIDE 1 MG/ML
INJECTION INTRAMUSCULAR; INTRAVENOUS PRN
Status: DISCONTINUED | OUTPATIENT
Start: 2018-08-13 | End: 2018-08-13 | Stop reason: SDUPTHER

## 2018-08-13 RX ORDER — SODIUM CHLORIDE 0.9 % (FLUSH) 0.9 %
10 SYRINGE (ML) INJECTION EVERY 12 HOURS SCHEDULED
Status: DISCONTINUED | OUTPATIENT
Start: 2018-08-13 | End: 2018-08-13 | Stop reason: HOSPADM

## 2018-08-13 RX ORDER — MORPHINE SULFATE 2 MG/ML
1 INJECTION, SOLUTION INTRAMUSCULAR; INTRAVENOUS EVERY 5 MIN PRN
Status: DISCONTINUED | OUTPATIENT
Start: 2018-08-13 | End: 2018-08-13 | Stop reason: HOSPADM

## 2018-08-13 RX ORDER — PROMETHAZINE HYDROCHLORIDE 25 MG/ML
6.25 INJECTION, SOLUTION INTRAMUSCULAR; INTRAVENOUS
Status: DISCONTINUED | OUTPATIENT
Start: 2018-08-13 | End: 2018-08-13 | Stop reason: HOSPADM

## 2018-08-13 RX ORDER — SODIUM CHLORIDE 0.9 % (FLUSH) 0.9 %
10 SYRINGE (ML) INJECTION PRN
Status: DISCONTINUED | OUTPATIENT
Start: 2018-08-13 | End: 2018-08-13 | Stop reason: HOSPADM

## 2018-08-13 RX ORDER — HYDRALAZINE HYDROCHLORIDE 20 MG/ML
5 INJECTION INTRAMUSCULAR; INTRAVENOUS EVERY 10 MIN PRN
Status: DISCONTINUED | OUTPATIENT
Start: 2018-08-13 | End: 2018-08-13 | Stop reason: HOSPADM

## 2018-08-13 RX ORDER — SODIUM CHLORIDE, SODIUM LACTATE, POTASSIUM CHLORIDE, CALCIUM CHLORIDE 600; 310; 30; 20 MG/100ML; MG/100ML; MG/100ML; MG/100ML
INJECTION, SOLUTION INTRAVENOUS CONTINUOUS
Status: DISCONTINUED | OUTPATIENT
Start: 2018-08-13 | End: 2018-08-13 | Stop reason: HOSPADM

## 2018-08-13 RX ORDER — LABETALOL HYDROCHLORIDE 5 MG/ML
5 INJECTION, SOLUTION INTRAVENOUS EVERY 10 MIN PRN
Status: DISCONTINUED | OUTPATIENT
Start: 2018-08-13 | End: 2018-08-13 | Stop reason: HOSPADM

## 2018-08-13 RX ORDER — FENTANYL CITRATE 50 UG/ML
INJECTION, SOLUTION INTRAMUSCULAR; INTRAVENOUS PRN
Status: DISCONTINUED | OUTPATIENT
Start: 2018-08-13 | End: 2018-08-13 | Stop reason: SDUPTHER

## 2018-08-13 RX ORDER — LIDOCAINE HYDROCHLORIDE 10 MG/ML
1 INJECTION, SOLUTION EPIDURAL; INFILTRATION; INTRACAUDAL; PERINEURAL
Status: DISCONTINUED | OUTPATIENT
Start: 2018-08-13 | End: 2018-08-13 | Stop reason: HOSPADM

## 2018-08-13 RX ORDER — ONDANSETRON 2 MG/ML
4 INJECTION INTRAMUSCULAR; INTRAVENOUS PRN
Status: DISCONTINUED | OUTPATIENT
Start: 2018-08-13 | End: 2018-08-13 | Stop reason: HOSPADM

## 2018-08-13 RX ORDER — OXYCODONE HYDROCHLORIDE AND ACETAMINOPHEN 5; 325 MG/1; MG/1
2 TABLET ORAL PRN
Status: COMPLETED | OUTPATIENT
Start: 2018-08-13 | End: 2018-08-13

## 2018-08-13 RX ORDER — BUPIVACAINE HYDROCHLORIDE 5 MG/ML
INJECTION, SOLUTION EPIDURAL; INTRACAUDAL PRN
Status: DISCONTINUED | OUTPATIENT
Start: 2018-08-13 | End: 2018-08-13 | Stop reason: HOSPADM

## 2018-08-13 RX ORDER — PROPOFOL 10 MG/ML
INJECTION, EMULSION INTRAVENOUS PRN
Status: DISCONTINUED | OUTPATIENT
Start: 2018-08-13 | End: 2018-08-13 | Stop reason: SDUPTHER

## 2018-08-13 RX ORDER — ONDANSETRON 2 MG/ML
INJECTION INTRAMUSCULAR; INTRAVENOUS PRN
Status: DISCONTINUED | OUTPATIENT
Start: 2018-08-13 | End: 2018-08-13 | Stop reason: SDUPTHER

## 2018-08-13 RX ORDER — OXYCODONE HYDROCHLORIDE AND ACETAMINOPHEN 5; 325 MG/1; MG/1
1 TABLET ORAL PRN
Status: COMPLETED | OUTPATIENT
Start: 2018-08-13 | End: 2018-08-13

## 2018-08-13 RX ORDER — DEXAMETHASONE SODIUM PHOSPHATE 4 MG/ML
INJECTION, SOLUTION INTRA-ARTICULAR; INTRALESIONAL; INTRAMUSCULAR; INTRAVENOUS; SOFT TISSUE PRN
Status: DISCONTINUED | OUTPATIENT
Start: 2018-08-13 | End: 2018-08-13 | Stop reason: SDUPTHER

## 2018-08-13 RX ORDER — MAGNESIUM HYDROXIDE 1200 MG/15ML
LIQUID ORAL CONTINUOUS PRN
Status: DISCONTINUED | OUTPATIENT
Start: 2018-08-13 | End: 2018-08-13 | Stop reason: HOSPADM

## 2018-08-13 RX ORDER — LIDOCAINE HYDROCHLORIDE 10 MG/ML
INJECTION, SOLUTION INFILTRATION; PERINEURAL PRN
Status: DISCONTINUED | OUTPATIENT
Start: 2018-08-13 | End: 2018-08-13 | Stop reason: SDUPTHER

## 2018-08-13 RX ORDER — DIPHENHYDRAMINE HYDROCHLORIDE 50 MG/ML
12.5 INJECTION INTRAMUSCULAR; INTRAVENOUS
Status: DISCONTINUED | OUTPATIENT
Start: 2018-08-13 | End: 2018-08-13 | Stop reason: HOSPADM

## 2018-08-13 RX ORDER — MORPHINE SULFATE 2 MG/ML
2 INJECTION, SOLUTION INTRAMUSCULAR; INTRAVENOUS EVERY 5 MIN PRN
Status: DISCONTINUED | OUTPATIENT
Start: 2018-08-13 | End: 2018-08-13 | Stop reason: HOSPADM

## 2018-08-13 RX ORDER — HYDROMORPHONE HCL 110MG/55ML
PATIENT CONTROLLED ANALGESIA SYRINGE INTRAVENOUS PRN
Status: DISCONTINUED | OUTPATIENT
Start: 2018-08-13 | End: 2018-08-13 | Stop reason: SDUPTHER

## 2018-08-13 RX ADMIN — FENTANYL CITRATE 50 MCG: 50 INJECTION INTRAMUSCULAR; INTRAVENOUS at 12:01

## 2018-08-13 RX ADMIN — PROPOFOL 200 MG: 10 INJECTION, EMULSION INTRAVENOUS at 12:01

## 2018-08-13 RX ADMIN — Medication 900 MG: at 12:02

## 2018-08-13 RX ADMIN — HYDROMORPHONE HYDROCHLORIDE 0.25 MG: 1 INJECTION, SOLUTION INTRAMUSCULAR; INTRAVENOUS; SUBCUTANEOUS at 13:36

## 2018-08-13 RX ADMIN — SODIUM CHLORIDE, POTASSIUM CHLORIDE, SODIUM LACTATE AND CALCIUM CHLORIDE: 600; 310; 30; 20 INJECTION, SOLUTION INTRAVENOUS at 10:40

## 2018-08-13 RX ADMIN — FENTANYL CITRATE 50 MCG: 50 INJECTION INTRAMUSCULAR; INTRAVENOUS at 12:17

## 2018-08-13 RX ADMIN — HYDROMORPHONE HYDROCHLORIDE 0.5 MG: 2 INJECTION, SOLUTION INTRAMUSCULAR; INTRAVENOUS; SUBCUTANEOUS at 12:52

## 2018-08-13 RX ADMIN — DEXAMETHASONE SODIUM PHOSPHATE 5 MG: 4 INJECTION, SOLUTION INTRAMUSCULAR; INTRAVENOUS at 12:20

## 2018-08-13 RX ADMIN — OXYCODONE HYDROCHLORIDE AND ACETAMINOPHEN 1 TABLET: 5; 325 TABLET ORAL at 14:10

## 2018-08-13 RX ADMIN — LIDOCAINE HYDROCHLORIDE 40 MG: 10 INJECTION, SOLUTION INFILTRATION; PERINEURAL at 12:01

## 2018-08-13 RX ADMIN — HYDROMORPHONE HYDROCHLORIDE 0.25 MG: 1 INJECTION, SOLUTION INTRAMUSCULAR; INTRAVENOUS; SUBCUTANEOUS at 13:24

## 2018-08-13 RX ADMIN — MIDAZOLAM HYDROCHLORIDE 2 MG: 2 INJECTION, SOLUTION INTRAMUSCULAR; INTRAVENOUS at 11:57

## 2018-08-13 RX ADMIN — ONDANSETRON 4 MG: 2 INJECTION INTRAMUSCULAR; INTRAVENOUS at 12:20

## 2018-08-13 RX ADMIN — HYDROMORPHONE HYDROCHLORIDE 0.5 MG: 2 INJECTION, SOLUTION INTRAMUSCULAR; INTRAVENOUS; SUBCUTANEOUS at 12:20

## 2018-08-13 RX ADMIN — SODIUM CHLORIDE, POTASSIUM CHLORIDE, SODIUM LACTATE AND CALCIUM CHLORIDE: 600; 310; 30; 20 INJECTION, SOLUTION INTRAVENOUS at 12:20

## 2018-08-13 RX ADMIN — HYDROMORPHONE HYDROCHLORIDE 0.5 MG: 2 INJECTION, SOLUTION INTRAMUSCULAR; INTRAVENOUS; SUBCUTANEOUS at 12:47

## 2018-08-13 ASSESSMENT — PULMONARY FUNCTION TESTS
PIF_VALUE: 10
PIF_VALUE: 10
PIF_VALUE: 0
PIF_VALUE: 11
PIF_VALUE: 9
PIF_VALUE: 10
PIF_VALUE: 4
PIF_VALUE: 11
PIF_VALUE: 17
PIF_VALUE: 11
PIF_VALUE: 10
PIF_VALUE: 9
PIF_VALUE: 10
PIF_VALUE: 2
PIF_VALUE: 10
PIF_VALUE: 11
PIF_VALUE: 11
PIF_VALUE: 10
PIF_VALUE: 2
PIF_VALUE: 11
PIF_VALUE: 10
PIF_VALUE: 10
PIF_VALUE: 9
PIF_VALUE: 10
PIF_VALUE: 11
PIF_VALUE: 11
PIF_VALUE: 10
PIF_VALUE: 1
PIF_VALUE: 23
PIF_VALUE: 11
PIF_VALUE: 14
PIF_VALUE: 1
PIF_VALUE: 10
PIF_VALUE: 4
PIF_VALUE: 1
PIF_VALUE: 0
PIF_VALUE: 10
PIF_VALUE: 1
PIF_VALUE: 10
PIF_VALUE: 11
PIF_VALUE: 10
PIF_VALUE: 11
PIF_VALUE: 10
PIF_VALUE: 7
PIF_VALUE: 8
PIF_VALUE: 2
PIF_VALUE: 11
PIF_VALUE: 3
PIF_VALUE: 11
PIF_VALUE: 11
PIF_VALUE: 10
PIF_VALUE: 10
PIF_VALUE: 1
PIF_VALUE: 11
PIF_VALUE: 9

## 2018-08-13 ASSESSMENT — PAIN SCALES - GENERAL
PAINLEVEL_OUTOF10: 5
PAINLEVEL_OUTOF10: 5
PAINLEVEL_OUTOF10: 7

## 2018-08-13 ASSESSMENT — PAIN - FUNCTIONAL ASSESSMENT: PAIN_FUNCTIONAL_ASSESSMENT: 0-10

## 2018-08-13 NOTE — ANESTHESIA POSTPROCEDURE EVALUATION
apparent anesthetic complications    SUMMARY      Vital signs stable  OK to discharge from Stage I post anesthesia care.   Care transferred from Anesthesiology department on discharge from perioperative area

## 2018-08-13 NOTE — PROGRESS NOTES
Discharge instructions reviewed with patient/responsible adult and understanding verbalized. Discharge instructions signed and copies given. Patient discharged home with belongings.   Pt advanced from lying to sitting at side of bed without adverse events: VSS/RESP WNL   assessment unchanged- denies pain /tolerating oral liquids without ponv    Physician  out to see pt and family and discuss exam results with family /significant other/pts family verbalized understanding of postop activity restrictions -pt/family denies additional questions    Pain is tolerable 7/10, no signs of distress

## 2018-08-14 NOTE — OP NOTE
[facility] [id number]                                 OPERATIVE REPORT    PATIENT NAME: Isra Mcmahon                       :        1975  MED REC NO:   [id number]                          ROOM:  ACCOUNT NO:   [de-identified]                           ADMIT DATE: 2018  PROVIDER:     Brooke Everett MD    DATE OF PROCEDURE:  2018    PREOPERATIVE DIAGNOSIS:  Left great toe proximal phalanx intercondylar  fracture. POSTOPERATIVE DIAGNOSIS:  Left great toe proximal phalanx intercondylar  fracture. OPERATION PERFORMED:  Attempted closed reduction, then open reduction and  internal fixation using percutaneous K-wires, left great toe fracture. PRIMARY SURGEON:  Brooke Everett MD    ANESTHESIA:  General.    ANTIBIOTICS:  Ancef. DRAINS:  None. TUBES:  None. SPECIMENS:  None. ESTIMATED BLOOD LOSS:  Less than 50 mL. TOURNIQUET TIME:  Less than one hour. INDICATIONS:  The patient is a 75-year-old female, who on 2018 was  trying to put a slab of granite up on to his shelf and it slipped and came  down directly on her left great toe. She was found to have a comminuted  left great toe proximal phalanx fracture that was intra-articular and  displaced. She now presents for attempted closed reduction versus open  reduction and internal fixation of this fracture. Risks and benefits of  surgery were explained to the patient and her family. Informed consent was  signed in the chart prior to surgery. OPERATIVE PROCEDURE:  The patient was brought to the operating room and  after adequate general anesthesia, was placed in the supine position. A  nonsterile tourniquet was placed around proximal aspect of her left upper  thigh. Left lower extremity prepped and draped in sterile fashion. Her  leg was then exsanguinated and tourniquet inflated to 350 mmHg.   Attempted  closed reduction was then performed and we could not get the distal  fragments aligned well and therefore a transverse incision was made over  the medial aspect of the left first MTP joint extending along the medial  aspect of the proximal phalanx to the IP joint. She was carried down  through the subcutaneous tissue using tenotomy scissors. Care was taken  not to damage the neurovascular structures and her previous transverse  laceration, which extended from the medial to lateral side was then opened  up using tenotomy scissors. It was already bleeding from the closed  reduction and partially opened. Care was taken to leave the neurovascular  structures intact, so as not to devascularize the distal flap. The  intercondylar fracture was noted to be comminuted including on the dorsal  aspect. It was very unstable and at this point, the intercondylar fracture  was reduced and held with a Key elevator while a 0.045 K-wire was placed  from distal medial to proximal lateral and from distal lateral to proximal  medial in cross fashion. This gave excellent stability to the fracture. Excellent alignment was noted on AP, lateral and oblique and at this point,  the wounds were copiously irrigated with normal saline, closed using 2-0  Vicryl suture for the deep fascia, 3-0 Vicryl suture in buried fashion for  the subcutaneous tissue and 4-0 nylon in simple fashion for the skin. Area  was dressed with a Xeroform dry sterile dressing, sterile Bautista, Coban and  Ace bandage, and placed into a posterior splint out past the toes. Tourniquet was deflated after less than one hour. Toes were noted to pink  up nicely. The patient was awake in the operating room, brought to the  PACU in good condition. ADDENDUM:  Three views of the foot were obtained multiple times throughout  the procedure.   Final images were obtained, read by me and showed that the  intercondylar left great toe proximal phalanx fracture had been nearly  anatomically reduced and held with

## 2018-08-17 ENCOUNTER — TELEPHONE (OUTPATIENT)
Dept: ORTHOPEDIC SURGERY | Age: 43
End: 2018-08-17

## 2018-08-17 NOTE — TELEPHONE ENCOUNTER
RECEIVED [facility] PAPERWORK FROM [address] [place] [address]. I AM WAITING FOR A REPLY FROM GREG (JCL) REGARDING ANY ADDITIONAL TIME OFF HE APPROVES BEYOND THE DAY OF SURGERY.

## 2018-08-20 ENCOUNTER — TELEPHONE (OUTPATIENT)
Dept: ORTHOPEDIC SURGERY | Age: 43
End: 2018-08-20

## 2018-08-20 RX ORDER — CLINDAMYCIN HYDROCHLORIDE 300 MG/1
300 CAPSULE ORAL EVERY 8 HOURS
Qty: 21 CAPSULE | Refills: 0 | Status: SHIPPED | OUTPATIENT
Start: 2018-08-20 | End: 2018-09-05 | Stop reason: SDUPTHER

## 2018-08-29 ENCOUNTER — OFFICE VISIT (OUTPATIENT)
Dept: ORTHOPEDIC SURGERY | Age: 43
End: 2018-08-29

## 2018-08-29 VITALS
HEART RATE: 124 BPM | WEIGHT: 170 LBS | HEIGHT: 64 IN | BODY MASS INDEX: 29.02 KG/M2 | DIASTOLIC BLOOD PRESSURE: 83 MMHG | SYSTOLIC BLOOD PRESSURE: 122 MMHG

## 2018-08-29 DIAGNOSIS — S92.422D CLOSED DISPLACED FRACTURE OF DISTAL PHALANX OF LEFT GREAT TOE WITH ROUTINE HEALING, SUBSEQUENT ENCOUNTER: ICD-10-CM

## 2018-08-29 DIAGNOSIS — M79.672 FOOT PAIN, LEFT: Primary | ICD-10-CM

## 2018-08-29 PROCEDURE — 99024 POSTOP FOLLOW-UP VISIT: CPT | Performed by: ORTHOPAEDIC SURGERY

## 2018-09-02 ENCOUNTER — APPOINTMENT (OUTPATIENT)
Dept: GENERAL RADIOLOGY | Age: 43
End: 2018-09-02
Payer: COMMERCIAL

## 2018-09-02 ENCOUNTER — HOSPITAL ENCOUNTER (EMERGENCY)
Age: 43
Discharge: HOME | End: 2018-09-02
Attending: EMERGENCY MEDICINE
Payer: COMMERCIAL

## 2018-09-02 VITALS
DIASTOLIC BLOOD PRESSURE: 84 MMHG | RESPIRATION RATE: 16 BRPM | TEMPERATURE: 97.6 F | HEART RATE: 88 BPM | SYSTOLIC BLOOD PRESSURE: 133 MMHG | OXYGEN SATURATION: 100 %
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
## 2018-09-02 DIAGNOSIS — T85.848A PAIN FROM IMPLANTED HARDWARE, INITIAL ENCOUNTER: Primary | ICD-10-CM

## 2018-09-02 PROCEDURE — 6370000000 HC RX 637 (ALT 250 FOR IP): Performed by: EMERGENCY MEDICINE

## 2018-09-02 PROCEDURE — 73630 X-RAY EXAM OF FOOT: CPT

## 2018-09-02 PROCEDURE — 99283 EMERGENCY DEPT VISIT LOW MDM: CPT

## 2018-09-02 RX ORDER — OXYCODONE HYDROCHLORIDE AND ACETAMINOPHEN 5; 325 MG/1; MG/1
1 TABLET ORAL ONCE
Status: COMPLETED | OUTPATIENT
Start: 2018-09-02 | End: 2018-09-02

## 2018-09-02 RX ORDER — HYDROCODONE BITARTRATE AND ACETAMINOPHEN 5; 325 MG/1; MG/1
1 TABLET ORAL EVERY 6 HOURS PRN
Qty: 12 TABLET | Refills: 0 | Status: SHIPPED | OUTPATIENT
Start: 2018-09-02 | End: 2018-09-05

## 2018-09-02 RX ADMIN — OXYCODONE AND ACETAMINOPHEN 1 TABLET: 5; 325 TABLET ORAL at 20:30

## 2018-09-02 ASSESSMENT — PAIN SCALES - GENERAL
PAINLEVEL_OUTOF10: 1
PAINLEVEL_OUTOF10: 3
PAINLEVEL_OUTOF10: 8

## 2018-09-03 NOTE — ED PROVIDER NOTES
Tearful   Nursing note and vitals reviewed. MDM/ED Course  Radiology  Xr Foot Left (min 3 Views)    Result Date: 9/2/2018  EXAMINATION: 3 XRAY VIEWS OF THE LEFT FOOT 9/2/2018 8:38 pm COMPARISON: Three views left foot 08/29/2018. HISTORY: ORDERING SYSTEM PROVIDED HISTORY: pain, recent surgery TECHNOLOGIST PROVIDED HISTORY: Reason for exam:-> pain, recent surgery Ordering Physician Provided Reason for Exam: surgery 3 weeks ago; tripped over scooter tonight concerned pins may be messed up Acuity: Acute Type of Exam: Initial FINDINGS: *The lateral K-wire (extending medially, aiming proximal) as rotated and has been advanced about 5 mm. *The medial K-wire (extending laterally, aiming proximal) is unchanged in position. *Healing comminuted articular fracture head and neck proximal phalanx left hallux. *Diffuse soft tissue edema is probably reactive. 1. The lateral K-wire (extending medially, aiming proximal) as rotated and has been advanced about 5 mm. 2. The medial K-wire (extending laterally, aiming proximal) is unchanged in position. 3. Healing comminuted articular fracture head and neck proximal phalanx left hallux. 4. Diffuse soft tissue edema is probably reactive. I spoke with Dr. Mat Cooley. We thoroughly discussed the history, physical exam, laboratory and imaging studies, as well as, emergency department course. Based upon that discussion, we've decided to discharge Glenna Paulino home. We've agreed upon prompt follow in their office for a re-assessment as scheduled for Wednesday 9/6/18. Ok to turn k-wire so it won't stick out. We attempted to turn the K wire but was unsuccessful. Patient I decided to do bulky dressing so the part of the wire that is sticking out laterally will not get caught. I estimate there is LOW risk for COMPARTMENT SYNDROME, DEEP VENOUS THROMBOSIS, SEPTIC ARTHRITIS, TENDON OR NEUROVASCULAR INJURY, thus I consider the discharge disposition reasonable.  Glenna Paulino and I have discussed the diagnosis and risks, and we agree with discharging home to follow-up with their primary doctor or the referral orthopedist. We also discussed returning to the Emergency Department immediately if new or worsening symptoms occur. We have discussed the symptoms which are most concerning (e.g., changing or worsening pain, numbness, weakness) that necessitate immediate return. Final Impression    1. Pain from implanted hardware, initial encounter    2. Fall, initial encounter        Blood pressure 133/84, pulse 88, temperature 97.6 °F (36.4 °C), temperature source Oral, resp. rate 16, SpO2 100 %, not currently breastfeeding. ED Medication Orders     Start Ordered     Status Ordering Provider    09/02/18 2030 09/02/18 2025  oxyCODONE-acetaminophen (PERCOCET) 5-325 MG per tablet 1 tablet  ONCE      Last MAR action:  Given - by Sarah Gillespie on 09/02/18 at 2030 Betty Chauhan           Disposition:  Discharge to home in good condition. Patient was given scripts for the following medications. I counseled patient how to take these medications. Discharge Medication List as of 9/2/2018  9:55 PM      START taking these medications    Details   HYDROcodone-acetaminophen (NORCO) 5-325 MG per tablet Take 1 tablet by mouth every 6 hours as needed for Pain for up to 3 days. Intended supply: 3 days. Take lowest dose possible to manage pain., Disp-12 tablet, R-0Print             This chart was generated in part by using Dragon Dictation system and may contain errors related to that system including errors in grammar, punctuation, and spelling, as well as words and phrases that may be inappropriate. If there are any questions or concerns please feel free to contact the dictating provider for clarification.      Rachna Flores MD  [facility] Jose Han MD  09/04/18 0643

## 2018-09-05 ENCOUNTER — TELEPHONE (OUTPATIENT)
Dept: ORTHOPEDIC SURGERY | Age: 43
End: 2018-09-05

## 2018-09-05 ENCOUNTER — OFFICE VISIT (OUTPATIENT)
Dept: ORTHOPEDIC SURGERY | Age: 43
End: 2018-09-05

## 2018-09-05 DIAGNOSIS — T85.848A PAIN FROM IMPLANTED HARDWARE, INITIAL ENCOUNTER: ICD-10-CM

## 2018-09-05 DIAGNOSIS — S92.422D CLOSED DISPLACED FRACTURE OF DISTAL PHALANX OF LEFT GREAT TOE WITH ROUTINE HEALING, SUBSEQUENT ENCOUNTER: Primary | ICD-10-CM

## 2018-09-05 PROCEDURE — 99024 POSTOP FOLLOW-UP VISIT: CPT | Performed by: ORTHOPAEDIC SURGERY

## 2018-09-05 RX ORDER — CLINDAMYCIN HYDROCHLORIDE 300 MG/1
300 CAPSULE ORAL EVERY 8 HOURS
Qty: 21 CAPSULE | Refills: 0 | Status: CANCELLED | OUTPATIENT
Start: 2018-09-05

## 2018-09-05 RX ORDER — CLINDAMYCIN HYDROCHLORIDE 300 MG/1
300 CAPSULE ORAL EVERY 8 HOURS
Qty: 21 CAPSULE | Refills: 0 | Status: SHIPPED | OUTPATIENT
Start: 2018-09-05

## 2018-09-05 NOTE — TELEPHONE ENCOUNTER
Pt states that she was told at her appt this morning that a refill on her abx would be called in but it hasn't been sent yet. RX was sent by e-scribe.

## 2018-09-21 ENCOUNTER — OFFICE VISIT (OUTPATIENT)
Dept: ORTHOPEDIC SURGERY | Age: 43
End: 2018-09-21

## 2018-09-21 VITALS — WEIGHT: 170 LBS | HEIGHT: 64 IN | BODY MASS INDEX: 29.02 KG/M2

## 2018-09-21 DIAGNOSIS — S92.422D CLOSED DISPLACED FRACTURE OF DISTAL PHALANX OF LEFT GREAT TOE WITH ROUTINE HEALING, SUBSEQUENT ENCOUNTER: Primary | ICD-10-CM

## 2018-09-21 PROCEDURE — 99024 POSTOP FOLLOW-UP VISIT: CPT | Performed by: ORTHOPAEDIC SURGERY

## 2018-10-19 ENCOUNTER — OFFICE VISIT (OUTPATIENT)
Dept: ORTHOPEDIC SURGERY | Age: 43
End: 2018-10-19

## 2018-10-19 VITALS
BODY MASS INDEX: 29.02 KG/M2 | HEIGHT: 64 IN | DIASTOLIC BLOOD PRESSURE: 77 MMHG | HEART RATE: 96 BPM | WEIGHT: 169.97 LBS | SYSTOLIC BLOOD PRESSURE: 118 MMHG

## 2018-10-19 DIAGNOSIS — S92.422D OPEN DISPLACED FRACTURE OF DISTAL PHALANX OF LEFT GREAT TOE WITH ROUTINE HEALING, SUBSEQUENT ENCOUNTER: Primary | ICD-10-CM

## 2018-10-19 PROCEDURE — 99024 POSTOP FOLLOW-UP VISIT: CPT | Performed by: ORTHOPAEDIC SURGERY

## 2018-10-19 NOTE — PROGRESS NOTES
Subjective: Patient states that she is here for follow-up of her 8/13/18 left great toe closed reduction pinning of her distal P1 intra-articular fracture. She states she has almost no pain she is in regular shoe she still has some limited motion and tenderness when she is on her feet for long period of time  Objective: Physical exam shows incisions and open areas are all well-healed. She has 20° of IP flexion and extension nearly 90° of MTP extension and flexion extensor tendons are intact she has no instability no pain with mobilization through the IP joint  Imaging: 3 views of the left foot show the fracture continues to heal in she does have some lucency proximal to the medial articular surface of the distal aspect of the proximal phalanx  Assessment and plan:  She will continue to Coban this area she is in a regular shoe she'll do scar massage to decrease the sensitivity work on motion and follow up with me in 3 months repeat x-rays and I'll discharge her

## 2019-01-11 ENCOUNTER — OFFICE VISIT (OUTPATIENT)
Dept: ORTHOPEDIC SURGERY | Age: 44
End: 2019-01-11
Payer: COMMERCIAL

## 2019-01-11 VITALS — HEIGHT: 64 IN | BODY MASS INDEX: 29.02 KG/M2 | WEIGHT: 169.97 LBS

## 2019-01-11 DIAGNOSIS — S92.422D CLOSED DISPLACED FRACTURE OF DISTAL PHALANX OF LEFT GREAT TOE WITH ROUTINE HEALING, SUBSEQUENT ENCOUNTER: Primary | ICD-10-CM

## 2019-01-11 PROCEDURE — 99212 OFFICE O/P EST SF 10 MIN: CPT | Performed by: ORTHOPAEDIC SURGERY

## 2020-11-06 ENCOUNTER — HOSPITAL ENCOUNTER (OUTPATIENT)
Age: 45
Discharge: HOME | End: 2020-11-06
Payer: COMMERCIAL

## 2020-11-06 LAB — SARS-COV-2, NAAT: NOT DETECTED

## 2020-11-06 PROCEDURE — U0002 COVID-19 LAB TEST NON-CDC: HCPCS

## 2020-12-04 ENCOUNTER — HOSPITAL ENCOUNTER (OUTPATIENT)
Age: 45
Discharge: HOME | End: 2020-12-04
Payer: COMMERCIAL

## 2020-12-04 LAB — SARS-COV-2, NAAT: NOT DETECTED

## 2020-12-04 PROCEDURE — U0002 COVID-19 LAB TEST NON-CDC: HCPCS

## 2021-01-12 ENCOUNTER — OFFICE VISIT (OUTPATIENT)
Dept: ORTHOPEDIC SURGERY | Age: 46
End: 2021-01-12
Payer: COMMERCIAL

## 2021-01-12 VITALS — HEIGHT: 64 IN | WEIGHT: 169 LBS | BODY MASS INDEX: 28.85 KG/M2
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
## 2021-01-12 DIAGNOSIS — S61.451A DOG BITE, HAND, RIGHT, INITIAL ENCOUNTER: ICD-10-CM

## 2021-01-12 DIAGNOSIS — M79.641 RIGHT HAND PAIN: Primary | ICD-10-CM

## 2021-01-12 PROCEDURE — 99213 OFFICE O/P EST LOW 20 MIN: CPT | Performed by: ORTHOPAEDIC SURGERY

## 2021-01-12 NOTE — PROGRESS NOTES
Chief Complaint  Hand Injury (OPNP RIGHT HAND INJURY: NEW XRAYS TODAY )      History of Present Illness:  Ledon Call is a 50 y.o. female right-hand-dominant, healthy and active female, without diabetes, who presents for a right hand dog bite laceration dorsally over the small finger, this occurred 3 days ago, breaking up a dog fight. She cleansed the wound well, then placed some skin glue on the wound. This led to some increased erythema pain and swelling, she took the skin glue off and now has had some scant clearish drainage. She is currently on doxycycline. She feels that her symptoms are slightly improved today. Denies fevers. Her pain is minimal.  No numbness reported.     Medical History  Past Medical History:   Diagnosis Date    Displaced fracture of distal phalanx of left great toe with routine healing 8/29/2018     Past Surgical History:   Procedure Laterality Date    BLADDER REPAIR      CYST REMOVAL      mole    FRACTURE SURGERY Left 08/13/2018    pinning great toe    HYSTERECTOMY      OPEN TREATMENT METATARSAL FRACTURE EACH Left 8/13/2018    CLOSED REDUCTION AND PERCUTANEOUS PINNING VERSUS OPEN REDUCTION INTERNAL FIXATION LEFT GREAT TOE FRACTURE performed by Kylee Gonzalez MD at [address]      Family History   Problem Relation Age of Onset    Heart Disease Mother     High Blood Pressure Mother     Colon Cancer Father      Social History     Socioeconomic History    Marital status:      Spouse name: Not on file    Number of children: Not on file    Years of education: Not on file    Highest education level: Not on file   Occupational History    Not on file   Social Needs    Financial resource strain: Not on file    Food insecurity     Worry: Not on file     Inability: Not on file    Transportation needs     Medical: Not on file     Non-medical: Not on file   Tobacco Use    Smoking status: Never Smoker    Smokeless tobacco: Never Used   Substance and Sexual Activity  Alcohol use: Yes     Comment: social     Drug use: No    Sexual activity: Yes     Partners: Male   Lifestyle    Physical activity     Days per week: Not on file     Minutes per session: Not on file    Stress: Not on file   Relationships    Social connections     Talks on phone: Not on file     Gets together: Not on file     Attends Hinduism service: Not on file     Active member of club or organization: Not on file     Attends meetings of clubs or organizations: Not on file     Relationship status: Not on file    Intimate partner violence     Fear of current or ex partner: Not on file     Emotionally abused: Not on file     Physically abused: Not on file     Forced sexual activity: Not on file   Other Topics Concern    Not on file   Social History Narrative    Not on file     Current Outpatient Medications   Medication Sig Dispense Refill    clindamycin (CLEOCIN) 300 MG capsule Take 1 capsule by mouth every 8 hours 21 capsule 0    levofloxacin (LEVAQUIN) 500 MG tablet Take 500 mg by mouth      amitriptyline (ELAVIL) 50 MG tablet Take 50 mg by mouth nightly      ibuprofen (IBU) 600 MG tablet Take 1 tablet by mouth every 6 hours as needed for Pain 40 tablet 0    cephALEXin (KEFLEX) 500 MG capsule Take 1 capsule by mouth 4 times daily 40 capsule 0    magnesium oxide (MAG-OX) 400 MG tablet Take 400 mg by mouth daily      venlafaxine (EFFEXOR XR) 75 MG extended release capsule Take 75 mg by mouth daily      spironolactone (ALDACTONE) 25 MG tablet Take 25 mg by mouth daily       No current facility-administered medications for this visit. Allergies   Allergen Reactions    Amoxicillin Rash    Nitrofurantoin Rash    Vancomycin Rash       Review of Systems  Relevant review of systems reviewed and available in the patient's chart    Vital Signs  There were no vitals filed for this visit.     General Exam:   Constitutional: Patient is adequately groomed with no evidence of malnutrition Mental Status: The patient is oriented to time, place and person. The patient's mood and affect are appropriate. Lymphatic: The lymphatic examination bilaterally reveals all areas to be without enlargement or induration. Neurological: The patient has good coordination. There is no weakness or sensory deficit. Hand Examination  Inspection: Right hand and right small finger reveal a mild swelling dorsal ulnarly. Laceration is short oblique in about 1.5 cm overlying the small finger proximal phalanx level. Scant clearish fluid noted. No streaking erythema, no deformity    Palpation: Palpation of the area reveals slight tenderness. No fluctuance. No guarding. Range of Motion: Mild stiffness, no guarding with motion. Tendon function is present    Strength: Intact neurovascular exam    Special Tests: No streaking erythema or lymphadenopathy appreciated    Skin: There are no additional worrisome rashes, ulcerations or lesions. Gait: normal    Circulation: well perfused       Radiology:     X-rays obtained and reviewed in office:  Views 3  Location right hand  Impression :  No fracture or dislocation. No obvious foreign body      Assessment: Right hand laceration, dog bite    Impression:   Encounter Diagnoses   Name Primary?     Right hand pain Yes    Dog bite, hand, right, initial encounter        Office Procedures:  Orders Placed This Encounter   Procedures    XR HAND RIGHT (MIN 3 VIEWS)     Standing Status:   Future     Number of Occurrences:   1     Standing Expiration Date:   1/11/2022 Treatment Plan: We discussed cleansing of the wound, appropriate wound care monitoring. Finish antibiotics. With today's evaluation, I think that she will do well without surgical intervention. Her tendon function appears to be intact. No fracture on exam.  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 I would be happy to see her or respond to any communication if symptoms are not fully improving or take a course for the worse and worsen. Otherwise she will follow-up as needed as she continues to improve. All questions and concerns were addressed today. Patient is in agreement with the plan. [address] [place], MD  Hand & Upper Extremity Surgery  [address]  A partner of [facility] ([facility])        Please note that this transcription was created using voice recognition software. Any errors are unintentional and may be due to voice recognition transcription.

## 2021-08-04 ENCOUNTER — HOSPITAL ENCOUNTER (OUTPATIENT)
Age: 46
Discharge: HOME | End: 2021-08-04
Payer: COMMERCIAL

## 2021-08-04 LAB — SARS-COV-2, NAAT: NOT DETECTED

## 2021-08-04 PROCEDURE — 87635 SARS-COV-2 COVID-19 AMP PRB: CPT

## (undated) DEVICE — GLOVE SURG SZ 75 L12IN FNGR THK94MIL STD WHT LTX FREE

## (undated) DEVICE — SPLINT ORTH W4XL30IN LAYERED FBRGLS FOAM PD BRTH BK MOLD

## (undated) DEVICE — SET GRAV VENT NVENT CK VLV 3 NDL FREE PRT 10 GTT

## (undated) DEVICE — INTENDED FOR TISSUE SEPARATION, AND OTHER PROCEDURES THAT REQUIRE A SHARP SURGICAL BLADE TO PUNCTURE OR CUT.: Brand: BARD-PARKER ® STAINLESS STEEL BLADES

## (undated) DEVICE — SUTURE VCRL SZ 3-0 L18IN ABSRB UD L26MM SH 1/2 CIR J864D

## (undated) DEVICE — GLOVE SURG SZ 8 L12IN FNGR THK94MIL STD WHT LTX FREE

## (undated) DEVICE — SUTURE VCRL SZ 2-0 L18IN ABSRB UD CT-1 L36MM 1/2 CIR J839D

## (undated) DEVICE — CATHETER IV 20GA L1.25IN PNK FEP SFTY STR HUB RADPQ DISP

## (undated) DEVICE — K WIRE FIX L152MM DIA1.6MM S STL 2 TRCR PNT
Type: IMPLANTABLE DEVICE | Status: NON-FUNCTIONAL
Removed: 2018-08-13

## (undated) DEVICE — PADDING CAST W4INXL4YD NONSTERILE COT RAYON MICROPLEATED

## (undated) DEVICE — DRAPE EQUIP C ARM MINI 10000100] TIDI PRODUCTS INC]

## (undated) DEVICE — GOWN,SIRUS,POLYRNF,SETINSLV,L,20/CS: Brand: MEDLINE

## (undated) DEVICE — ABDOMINAL PAD: Brand: DERMACEA

## (undated) DEVICE — GLOVE,SURG,SENSICARE,ALOE,LF,PF,9: Brand: MEDLINE

## (undated) DEVICE — 3M™ TEGADERM™ TRANSPARENT FILM DRESSING FRAME STYLE, 1624W, 2-3/8 IN X 2-3/4 IN (6 CM X 7 CM), 100/CT 4CT/CASE: Brand: 3M™ TEGADERM™

## (undated) DEVICE — SET ADMIN PRIMING 7ML L30IN 7.35LB 20 GTT 2ND RLER CLMP

## (undated) DEVICE — TRAY PREP DRY W/ PREM GLV 2 APPL 6 SPNG 2 UNDPD 1 OVERWRAP

## (undated) DEVICE — GLOVE,SURG,SENSICARE,ALOE,LF,PF,7: Brand: MEDLINE

## (undated) DEVICE — ZIMMER® STERILE DISPOSABLE TOURNIQUET CUFF WITH PLC, DUAL PORT, SINGLE BLADDER, 30 IN. (76 CM)

## (undated) DEVICE — PACK EXTREMITY XR

## (undated) DEVICE — SOLUTION IV 1000ML LAC RINGERS PH 6.5 INJ USP VIAFLX PLAS

## (undated) DEVICE — BANDAGE GZ W2XL75IN ST RAYON POLY CNFRM STRTCH LTWT

## (undated) DEVICE — SINGLE USE DEVICE INTENDED TO COVER EXPOSED ENDS OF ORTHOPEDIC PIN AND K-WIRES TO HELP PROTECT THE EXPOSED WIRE FROM SNAGGING ON CLOTHING.: Brand: OXBORO™ PIN COVER

## (undated) DEVICE — SUTURE ETHLN SZ 4-0 L18IN NONABSORBABLE BLK L19MM PS-2 3/8 1667H